# Patient Record
(demographics unavailable — no encounter records)

---

## 2024-11-22 NOTE — HEALTH RISK ASSESSMENT
[Yes] : Yes [2 - 3 times a week (3 pts)] : 2 - 3  times a week (3 points) [1 or 2 (0 pts)] : 1 or 2 (0 points) [Never (0 pts)] : Never (0 points) [No] : In the past 12 months have you used drugs other than those required for medical reasons? No [0] : 2) Feeling down, depressed, or hopeless: Not at all (0) [PHQ-2 Negative - No further assessment needed] : PHQ-2 Negative - No further assessment needed [Never] : Never [Patient reported mammogram was normal] : Patient reported mammogram was normal [Patient reported PAP Smear was normal] : Patient reported PAP Smear was normal [Patient reported bone density results were abnormal] : Patient reported bone density results were abnormal [Patient reported colonoscopy was normal] : Patient reported colonoscopy was normal [Very Good] : ~his/her~  mood as very good [No falls in past year] : Patient reported no falls in the past year [YES] : Yes [Have you attended a firearm safety workshop or class?] : A firearm safety workshop or class has been attended. [No Retinopathy] : No retinopathy [HIV test declined] : HIV test declined [Hepatitis C test declined] : Hepatitis C test declined [None] : None [With Significant Other] : lives with significant other [# of Members in Household ___] :  household currently consist of [unfilled] member(s) [Employed] : employed [College] : College [] :  [# Of Children ___] : has [unfilled] children [Sexually Active] : sexually active [Feels Safe at Home] : Feels safe at home [Fully functional (bathing, dressing, toileting, transferring, walking, feeding)] : Fully functional (bathing, dressing, toileting, transferring, walking, feeding) [Fully functional (using the telephone, shopping, preparing meals, housekeeping, doing laundry, using] : Fully functional and needs no help or supervision to perform IADLs (using the telephone, shopping, preparing meals, housekeeping, doing laundry, using transportation, managing medications and managing finances) [Reports normal functional visual acuity (ie: able to read med bottle)] : Reports normal functional visual acuity [Seat Belt] :  uses seat belt [FreeTextEntry1] : prevention of disease  [de-identified] : denies [de-identified] : denies [Audit-CScore] : 3 [de-identified] : run/walk 3x a week, HIT, [place], Yoga daily  [de-identified] : food fuels programs  [[id number]] : 0 [Are there any unlocked firearms stored in your household?] : No unlocked firearms in the household. [Are there any firearms stored in your household that are loaded?] : No firearms are stored in the household loaded. [Are there any children in your household?] : No children are in the household. [Has anyone in the household been feeling low/depressed/been struggling?] : No one in the household has been feeling low/depressed/been struggling. [EyeExamDate] : 2024 [High Risk Behavior] : no high risk behavior [Reports changes in hearing] : Reports no changes in hearing [Reports changes in vision] : Reports no changes in vision [Reports changes in dental health] : Reports no changes in dental health [TB Exposure] : is not being exposed to tuberculosis [MammogramDate] : 2022 [PapSmearDate] : 12/2023 [BoneDensityDate] : 2021 [BoneDensityComments] : osteopenia [ColonoscopyComments] : over 5 years ago [FreeTextEntry2] :  with [facility] [de-identified] : UTRIANNA

## 2024-11-22 NOTE — HISTORY OF PRESENT ILLNESS
[FreeTextEntry1] : annual wellness visit no acute complaints  [de-identified] : F w/ PMHx of asthma, postnasal drip, HLD, Hashimoto's Thyroiditis, and thyroid nodules presents to office for her annual wellness exam. She offers no acute complaints.  Pt was seen by endocrinology, Dr. Joyce for Hashimoto's Thyroiditis and thyroid nodules, she is not maintained on any medication, thyroid and parathyroid US stable. It was recommended as long as her TFTs stay within range she does not need to follow up.  Pt is overdue for a colonoscopy for colon cancer screening.  Pt reports asthma is well controlled, she uses Albuterol prior to exercise.   Pt has osteopenia and is overdue for a DEXA scan.

## 2024-11-22 NOTE — PHYSICAL EXAM
[No Acute Distress] : no acute distress [Normal Sclera/Conjunctiva] : normal sclera/conjunctiva [EOMI] : extraocular movements intact [Normal Outer Ear/Nose] : the outer ears and nose were normal in appearance [Normal Oropharynx] : the oropharynx was normal [Normal TMs] : both tympanic membranes were normal [No JVD] : no jugular venous distention [No Lymphadenopathy] : no lymphadenopathy [Supple] : supple [No Respiratory Distress] : no respiratory distress  [No Accessory Muscle Use] : no accessory muscle use [Clear to Auscultation] : lungs were clear to auscultation bilaterally [Regular Rhythm] : with a regular rhythm [Normal S1, S2] : normal S1 and S2 [No Edema] : there was no peripheral edema [No Extremity Clubbing/Cyanosis] : no extremity clubbing/cyanosis [Soft] : abdomen soft [Normal Anterior Cervical Nodes] : no anterior cervical lymphadenopathy [No CVA Tenderness] : no CVA  tenderness [No Joint Swelling] : no joint swelling [Grossly Normal Strength/Tone] : grossly normal strength/tone [No Rash] : no rash [Coordination Grossly Intact] : coordination grossly intact [No Focal Deficits] : no focal deficits [Normal Gait] : normal gait [Normal Affect] : the affect was normal [Alert and Oriented x3] : oriented to person, place, and time [Normal Insight/Judgement] : insight and judgment were intact [de-identified] : bradycardia

## 2024-11-22 NOTE — REVIEW OF SYSTEMS
[Recent Change In Weight] : ~T recent weight change [Vision Problems] : vision problems [Postnasal Drip] : postnasal drip [Back Pain] : back pain [Insomnia] : insomnia [Anxiety] : anxiety [Negative] : Heme/Lymph [Dysuria] : no dysuria [Hematuria] : no hematuria [FreeTextEntry2] : loss, intentional 23 pounds

## 2024-11-22 NOTE — PLAN
[FreeTextEntry1] : #Annual Wellness Examination / Health Maintenance comprehensive labs annual gyn exam for PAP. Rx for mammogram, breast US and DEXA imaging annual dermatology, ophthalmology, and dental exams Mediterranean diet and continued cardiovascular exercise regimen Influenza vaccine  referral for colonoscopy for colon cancer screening f/u in 1 year  #Osteopenia Rx for DEXA scan  #Hypothyroidism / Thyroid nodules check TFTs, if abnormal with refer back to endocrinology  #Asthma, well controlled continue Albuterol PRN

## 2024-12-06 NOTE — DISCUSSION/SUMMARY
[FreeTextEntry1] : Health Maintenance: -Pap neg '23, repeat '26 -Mammo and breast US Rx -TBSE -colonoscopy guidelines reviewed with patient -Achieve Vit D levels of 30-40, intake of 1100 mg daily calcium mostly thru dark green leafy greens and milk products, exercise 30 minutes TIW

## 2024-12-06 NOTE — HISTORY OF PRESENT ILLNESS
[FreeTextEntry1] : 60 yo G0 female presents today for gyn visit  Allergies: Sulfa  Menstrual triad: 12 x 28 x heavy LMP 2008- Hysterectomy due to fibroids  Gyn: Fibroids Myomectomy [facility]  Myomectomy [facility]  Hysterectomy [facility] Pap 2022- NL right breast biopsy requested but negative follow up in 2021.  Sx: Fibroids Myomectomy [facility]  Vaginal Myomectomy [facility]  (Supracervical) Hysterectomy [facility]  PMHx Asthma- uses [facility] PRN Osteopenia Hashimoto's Thyroiditis Blood transfusion (w/ 1st myomectomy)  Mother: Breast cancer 50s, Hypothyroidism Father: Lung cancer, 71yo  [Mammogramdate] : 2022 [TextBox_19] : upcoming Jan mammo. Neg in 2022 [PapSmeardate] : 12/1/23 [BoneDensityDate] : 2022 [TextBox_43] : Dr Arevalo in past. Dr Graves GI [ColonoscopyDate] : 2017

## 2024-12-06 NOTE — HISTORY OF PRESENT ILLNESS
[FreeTextEntry1] : 62 yo G0 female presents today for gyn visit  Allergies: Sulfa  Menstrual triad: 12 x 28 x heavy LMP 2008- Hysterectomy due to fibroids  Gyn: Fibroids Myomectomy [facility]  Myomectomy [facility]  Hysterectomy [facility] Pap 2022- NL right breast biopsy requested but negative follow up in 2021.  Sx: Fibroids Myomectomy [facility]  Vaginal Myomectomy [facility]  (Supracervical) Hysterectomy [facility]  PMHx Asthma- uses [facility] PRN Osteopenia Hashimoto's Thyroiditis Blood transfusion (w/ 1st myomectomy)  Mother: Breast cancer 50s, Hypothyroidism Father: Lung cancer, 71yo  [TextBox_19] : upcoming Jan mammo. Neg in 2022 [Mammogramdate] : 2022 [PapSmeardate] : 12/1/23 [BoneDensityDate] : 2022 [ColonoscopyDate] : 2017 [TextBox_43] : Dr Arevalo in past. Dr Graves GI

## 2025-07-29 NOTE — HISTORY OF PRESENT ILLNESS
[FreeTextEntry1] : Ms. Sims is a 61 year old woman who presents for a consultation for newly diagnosed left breast cancer. She is asymptomatic. No palpable breast or axillary lumps, nipple discharge, or breast skin changes. She has a vacation planned around the Labor Day weekend.  Her family history is significant for breast cancer in the mother at 50.

## 2025-07-29 NOTE — CONSULT LETTER
[Dear  ___] : Dear  [unfilled], [Consult Letter:] : I had the pleasure of evaluating your patient, [unfilled]. [Please see my note below.] : Please see my note below. [Consult Closing:] : Thank you very much for allowing me to participate in the care of this patient.  If you have any questions, please do not hesitate to contact me. [Sincerely,] : Sincerely, [DrFrida  ___] : Dr. TRAN [FreeTextEntry3] : Madhuri Elizabeth MD FACS

## 2025-07-29 NOTE — PAST MEDICAL HISTORY
[Menarche Age ____] : age at menarche was [unfilled] [Menopause Age____] : age at menopause was [unfilled] [Total Preg ___] : G[unfilled] [Surgical Menopause] : The patient is in surgical menopause [Approximately ___] : the LMP was approximately [unfilled] [History of Hormone Replacement Treatment] : has no history of hormone replacement treatment [FreeTextEntry7] : x20 yrs. [FreeTextEntry8] : n/a.

## 2025-07-29 NOTE — PHYSICAL EXAM
[Normocephalic] : normocephalic [Atraumatic] : atraumatic [Sclera nonicteric] : sclera nonicteric [Supple] : supple [No Supraclavicular Adenopathy] : no supraclavicular adenopathy [No Cervical Adenopathy] : no cervical adenopathy [Clear to Auscultation Bilat] : clear to auscultation bilaterally [Normal Sinus Rhythm] : normal sinus rhythm [Examined in the supine and seated position] : examined in the supine and seated position [Bra Size: ___] : Bra Size: [unfilled] [No dominant masses] : no dominant masses in right breast  [No dominant masses] : no dominant masses left breast [No Nipple Retraction] : no left nipple retraction [No Nipple Discharge] : no left nipple discharge [No Axillary Lymphadenopathy] : no left axillary lymphadenopathy [No Edema] : no edema [No Rashes] : no rashes [No Ulceration] : no ulceration [de-identified] : R hand dominant.

## 2025-07-29 NOTE — ASSESSMENT
[FreeTextEntry1] : Ms. Sims is a 61 year old woman with a left breast cancer. clinical stage I, cT1 cN0. The imaging and pathology are reviewed with her. We will follow up on the receptor status. A diagnostic mammogram and US are ordered to rule out any other foci of disease prior to surgery.  Given her diagnosis of breast cancer, we discussed genetic testing including the risks, benefits, and implications of the results from negative, to a variant of uncertain significance, to positive. A negative result does not exclude the possibility of a gene mutation that is at work but has not yet been discovered, and the interpretation of genetic testing results is to be done in conjunction with consideration of the family history. With a variant of uncertain significance, there is insufficient evidence to determine if the finding is benign or pathogenic; management is not changed based on variants of uncertain significance, and should new  to a change in the classification, the patient will be updated. With a positive gene mutation, management can change. Management options for carrying a mutation with an increased risk of breast cancer range from heightened surveillance with breast MRI to prophylactic mastectomies. Depending on the mutation, there may be an elevated risk for other types of cancer which would influence management accordingly. Emotional, family, insurance, and cost concerns are reviewed. Written information is provided. Ms. Sims is interested and genetic testing is drawn.   The use of multimodality therapy for the treatment of breast cancer is discussed.   Surgical options are breast conservation therapy which consists of a lumpectomy and radiation, versus mastectomy. The two approaches offer equivalent survival. The procedure, risks and benefits are reviewed. With a lumpectomy, the importance of negative margins and the possibility of a re-excision are discussed. As her tumor is not palpable, preoperative localization with a Magseed by the Radiologist would be needed. Adjuvant radiation treatment to complete breast conservation therapy is reviewed. The option of oncoplastic procedure is reviewed. With a mastectomy, there are different techniques including the nipple-sparing approach. The loss of sensation and risk of nipple loss are discussed, and should there be any cancer cells in the intra-nipple tissue on pathology, a subsequent nipple excision would be recommended. Options for immediate breast reconstruction range from implants to autologous flaps, and this may be discussed with the Plastic Surgeon.  Ms. Sims is leaning towards a lumpectomy but the genetic testing may influence her choice of surgery. A referral to the Plastic Surgeon is provided in the case of a mastectomy.   We reviewed the use of sentinel node biopsy. The procedure, risks, and benefits of axillary surgery are discussed. Several studies show that the sentinel node biopsy can be omitted in certain patients having a lumpectomy and radiation. In Ms. Sims's case, this depends on the hormone receptor status which is pending.   Final surgical pathology will determine subsequent treatment including chemotherapy, anti-Her2 targeted therapy, radiation, and anti-hormonal therapy. The use of Oncotype testing if the receptor results are hormone positive, Her2 negative is reviewed. Referrals to the Medical Oncologist and/or Radiation Oncologist will be provided accordingly.   I would like to see her back for a follow-up in 2 weeks to review the receptor status, mammogram, US, genetic testing, Plastic Surgeon consult, and final plan of treatment.   The patient had a baseline SOZO measurement, which I reviewed today. The score is 0.5. Bioimpedance spectroscopy helps identify the onset of lymphedema in an arm before patients experience noticeable swelling. Research has shown that the early detection of lymphedema using L-Dex combined with treatment can reduce progression to chronic lymphedema by 95% in breast cancer patients. Whenever possible, patients are tested for baseline L-Dex score before cancer treatment begins and then are reassessed during regular follow -up visits using the SOZO device. Otherwise, this can be started postoperatively and continued during regular follow- up visits. If the patients L-Dex score increases above normal levels, that is a sign that lymphedema is developing, and a referral is made to physical therapy for further evaluation and /or early compression treatment. Lymphedema assessment with the SOZO L-Dex score is recommended to be done every three months for the first three years and then every six months for years four and five and annually thereafter.   There is adherence to evidence-based guidelines and shared decision-marking. No barriers to care are identified.

## 2025-07-29 NOTE — DATA REVIEWED
[FreeTextEntry1] : I have independently reviewed the reports and the images.   B/l mammogram and US 1/27/25 - heterogenously dense  - BIRADS 1   Breast MRI 7/11/25 - 0.9 x 0.5 x 0.5 cm enhancing mass in L lateral breast; L US and bx - no lymphadenopathy -  BIRADS 0  L limited US 7/18/25 - 0.7 x 0.6 x 0.7 cm mass in L breast 3:00 N9; L US bx -  BIRADS 4    US bx 7/23/25 - L wing joselyn clip = invasive mammary carcinoma, SBR 6/9, receptor status pending